# Patient Record
Sex: MALE | Race: WHITE
[De-identification: names, ages, dates, MRNs, and addresses within clinical notes are randomized per-mention and may not be internally consistent; named-entity substitution may affect disease eponyms.]

---

## 2019-11-13 ENCOUNTER — HOSPITAL ENCOUNTER (EMERGENCY)
Dept: HOSPITAL 59 - ER | Age: 64
Discharge: HOME | End: 2019-11-13
Payer: COMMERCIAL
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
## 2019-11-13 DIAGNOSIS — S61.310A: Primary | ICD-10-CM

## 2019-11-13 DIAGNOSIS — I10: ICD-10-CM

## 2019-11-13 PROCEDURE — 99284 EMERGENCY DEPT VISIT MOD MDM: CPT

## 2019-11-13 PROCEDURE — 73140 X-RAY EXAM OF FINGER(S): CPT

## 2019-11-13 NOTE — EMERGENCY DEPARTMENT RECORD
History of Present Illness





- General


Chief Complaint: Laceration(s)


Stated Complaint: FINGER LAC


Time Seen by Provider: 11/13/19 13:45


Source: Patient, RN notes reviewed


Mode of Arrival: Ambulatory





- History of Present Illness


Initial Commments: 


finger injuried on a router one hour prior to arrival. right index finger





Place: Home


Context: Accidental


Associated Symptoms: None





- [place] Coma Scale


Eye Response: (4) Open spontaneously


Motor Response: (6) Obeys commands


Verbal Response: (5) Oriented


[place] Total: 15





- Related Data


                                Home Medications











 Medication  Instructions  Recorded  Confirmed  Last Taken


 


Atorvastatin Calcium 20 mg PO DAILY 11/13/19 11/13/19 Unknown


 


Cholecalciferol (Vitamin D3) 2,000 unit PO DAILY 11/13/19 11/13/19 Unknown





[Vitamin D3]    


 


Ezetimibe [Zetia] 10 mg PO DAILY 11/13/19 11/13/19 Unknown


 


Lisinopril 10 mg PO DAILY 11/13/19 11/13/19 Unknown


 


Multivitamin [Multiple Vitamins] 1 each PO DAILY 11/13/19 11/13/19 Unknown








                                  Previous Rx's











 Medication  Instructions  Recorded


 


Cephalexin [Keflex] 500 mg PO QID #40 cap 11/13/19


 


Hydrocodone/Acetaminophen [Norco 1 each PO Q6HR #12 tablet 11/13/19





5-325 Tablet]  











                                    Allergies











Allergy/AdvReac Type Severity Reaction Status Date / Time


 


No Known Drug Allergies Allergy   Verified 11/13/19 12:55














Travel Screening





- Travel/Exposure Within Last 30 Days


Have you traveled within the last 30 days?: No





Review of Systems


Reviewed: No additional complaints except as noted below


Constitutional: Reports: As per HPI.  Denies: Chills, Fever, Malaise, Night 

sweats, Weakness, Weight change


Eyes: Reports: As per HPI.  Denies: Eye discharge, Eye pain, Photophobia, Vision

change


ENT: Reports: As per HPI.  Denies: Congestion, Dental pain, Ear pain, Epistaxis,

Hearing loss, Throat pain


Respiratory: Reports: As per HPI.  Denies: Cough, Dyspnea, Hemoptysis, Stridor, 

Wheezes


Cardiovascular: Reports: As per HPI.  Denies: Arrhythmia, Chest pain, Dyspnea on

exertion, Edema, Murmurs, Orthopnea, Palpitations, Paroxysmal nocturnal dyspnea,

Rheumatic Fever, Syncope


Endocrine: Reports: As per HPI.  Denies: Fatigue, Heat or cold intolerance, 

Polydipsia, Polyuria


Gastrointestinal: Reports: As per HPI.  Denies: Abdominal pain, Constipation, 

Diarrhea, Hematemesis, Hematochezia, Melena, Nausea, Vomiting


Genitourinary: Reports: As per HPI.  Denies: Dysuria, Frequency, Hematuria, 

Incontinence, Retention, Testicular pain, Testicular mass, Urgency


Musculoskeletal: Reports: As per HPI.  Denies: Arthralgia, Back pain, Gout, 

Joint swelling, Myalgia, Neck pain


Skin: Reports: As per HPI.  Denies: Bruising, Change in color, Change in 

hair/nails, Lesions, Pruritus, Rash


Neurological: Reports: As per HPI.  Denies: Abnormal gait, Confusion, Headache, 

Numbness, Paresthesias, Seizure, Tingling, Tremors, Vertigo, Weakness


Psychiatric: Reports: As per HPI.  Denies: Anxiety, Auditory hallucinations, 

Depression, Homicidal thoughts, Suicidal thoughts, Visual hallucinations


Hematological/Lymphatic: Reports: As per HPI.  Denies: Anemia, Blood Clots, Easy

bleeding, Easy bruising, Swollen glands





Past Medical History





- SOCIAL HISTORY


Smoking Status: Never smoker


Alcohol Use: None


Drug Use: None





- RESPIRATORY


Hx Respiratory Disorders: No





- CARDIOVASCULAR


Hx Cardio Disorders: Yes


Hx Hypertension: Yes


Comment:: high cholesterol





- NEURO


Hx Neuro Disorders: No





- GI


Hx GI Disorders: No





- 


Hx Genitourinary Disorders: No





- ENDOCRINE


Hx Endocrine Disorders: No





- MUSCULOSKELETAL


Hx Musculoskeletal Disorders: No





- PSYCH


Hx Psych Problems: No





- HEMATOLOGY/ONCOLOGY


Hx Hematology/Oncology Disorders: No





Family Medical History


Any Significant Family History?: No





Physical Exam





- General


General Appearance: Alert, Oriented x3, Cooperative, No acute distress





- Head


Head exam: Normal inspection





- Eye


Eye exam: Normal appearance, PERRL


Pupils: Normal accommodation





- ENT


ENT exam: Normal exam, Mucous membranes moist, Normal external ear exam, Normal 

orophraynx, TM's normal bilaterally


Ear exam: Normal external inspection.  negative: External canal tenderness


Nasal Exam: Normal inspection.  negative: Discharge, Sinus tenderness


Mouth exam: Normal external inspection, Tongue normal


Teeth exam: Normal inspection.  negative: Dental caries


Throat exam: Normal inspection.  negative: Tonsillar erythema, Tonsillar exudate





- Neck


Neck exam: Normal inspection, Full ROM.  negative: Tenderness





- Respiratory


Respiratory exam: Normal lung sounds bilaterally.  negative: Respiratory 

distress





- Cardiovascular


Cardiovascular Exam: Regular rate, Normal rhythm, Normal heart sounds





- GI/Abdominal


GI/Abdominal exam: Soft, Normal bowel sounds.  negative: Tenderness





- Rectal


Rectal exam: Deferred





- 


 exam: Deferred





- Extremities


Extremities exam: Normal inspection, Full ROM, Normal capillary refill, 

Tenderness, Other (right index finger tip injury with some nailbed injury , 

nothing to suture)





- Back


Back exam: Reports: Normal inspection, Full ROM.  Denies: Muscle spasm, Rash 

noted, Tenderness





- Neurological


Neurological exam: Alert, Normal gait, Oriented X3, Reflexes normal





- Psychiatric


Psychiatric exam: Normal affect, Normal mood





- Skin


Skin exam: Dry, Intact, Normal color, Warm





Course





                                   Vital Signs











  11/13/19





  12:50


 


Temperature 97.8 F


 


Pulse Rate 81


 


Respiratory 18





Rate 


 


Blood Pressure 189/91


 


Pulse Ox 97














- Reevaluation(s)


Reevaluation #1: 


cleaned finer and digital block of the finger 


cleaned wound and debrided loose skin and nail.


11/13/19 14:53








Medical Decision Making





- Data Complexity


MDM Data: X-Ray Ordered and/or Reviewed (no fractures)





Disposition


Clinical Impression: 


Laceration of finger


Qualifiers:


 Encounter type: initial encounter Finger: index finger Damage to nail status: 

with damage Foreign body presence: without foreign body Laterality: right 

Qualified Code(s): S61.310A - Laceration without foreign body of right index 

finger with damage to nail, initial encounter





Disposition: Home, Self-Care


Condition: (1) Good


Instructions:  Laceration (ED)


Additional Instructions: 


follow up with Dr De La Rosa in 7 to 10 days sooner if signs of infection


Prescriptions: 


Hydrocodone/Acetaminophen [Norco 5-325 Tablet] 1 each PO Q6HR #12 tablet


Cephalexin [Keflex] 500 mg PO QID #40 cap


Forms:  Patient Portal Access


Time of Disposition: 15:10





Quality





- Quality Measures


Quality Measures: N/A





- Blood Pressure Screening


Does Patient Have Any of the Following: No


Blood Pressure Classification: Hypertensive Reading


Systolic Measurement: 189


Diastolic Measurement: 91


Screening for High Blood Pressure: < First Hypertensive BP, F/U Documented > 

[]


Pre-Hypertensive Follow-up Interventions: Referral to alternative/primary care 

provider.


First Hypertensive Follow-up Interventions: Referral to alternative/primary care

 provider.

## 2019-11-13 NOTE — RADIOLOGY REPORT
EXAMINATION:  Right Thumb, Minimum Two Views 

EXAM DATE:  11/13/2019 2:03 PM



TECHNIQUE:  PA, lateral, and oblique views



INDICATION:  right index finger injury

COMPARISON:  None



ENCOUNTER: Initial

_________________________



FINDINGS: 



3 views of the right second finger show no evidence of fracture or dislocation. Alignment is anatomic
. Degenerative changes are present within the DIP joint.

_________________________



IMPRESSION:



No fracture or dislocation.



Dictated by: Juan Philip MD on 11/13/2019 2:16 PM.

Electronically signed by: Juan Philip MD on 11/13/2019 2:16 PM.